# Patient Record
Sex: MALE | URBAN - METROPOLITAN AREA
[De-identification: names, ages, dates, MRNs, and addresses within clinical notes are randomized per-mention and may not be internally consistent; named-entity substitution may affect disease eponyms.]

---

## 2022-09-16 ENCOUNTER — OFFICE VISIT (OUTPATIENT)
Dept: URBAN - METROPOLITAN AREA CLINIC 50 | Facility: CLINIC | Age: 87
End: 2022-09-16
Payer: MEDICARE

## 2022-09-16 DIAGNOSIS — H35.341 MACULAR CYST, HOLE, OR PSEUDOHOLE, RIGHT EYE: ICD-10-CM

## 2022-09-16 DIAGNOSIS — H35.3131 NONEXUDATIVE MACULAR DEGENERATION, EARLY DRY STAGE, BILATERAL: ICD-10-CM

## 2022-09-16 DIAGNOSIS — Z96.1 PRESENCE OF INTRAOCULAR LENS: ICD-10-CM

## 2022-09-16 DIAGNOSIS — H16.223 KERATOCONJUNCTIVITIS SICCA, BILATERAL: ICD-10-CM

## 2022-09-16 DIAGNOSIS — H35.372 PUCKERING OF MACULA, LEFT EYE: ICD-10-CM

## 2022-09-16 DIAGNOSIS — E11.9 DIABETES MELLITUS TYPE 2 WITHOUT MENTION OF COMPLICATION: Primary | ICD-10-CM

## 2022-09-16 PROCEDURE — 99214 OFFICE O/P EST MOD 30 MIN: CPT | Performed by: OPTOMETRIST

## 2022-09-16 PROCEDURE — 92134 CPTRZ OPH DX IMG PST SGM RTA: CPT | Performed by: OPTOMETRIST

## 2022-09-16 ASSESSMENT — KERATOMETRY
OS: 46.33
OD: 46.55

## 2022-09-16 ASSESSMENT — INTRAOCULAR PRESSURE
OD: 14
OS: 17

## 2022-09-16 NOTE — IMPRESSION/PLAN
Impression: Diabetes mellitus Type 2 without mention of complication: F60.4. Plan: Diabetes type II: No background retinopathy, no signs of neovascularization noted. Discussed ocular and systemic benefits of blood sugar control. Discussed risks of progression. Patient to call if signs are symptoms should arise.

## 2022-09-16 NOTE — IMPRESSION/PLAN
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 Plan: OD: Discussed diagnosis in detail with patient. No changes indicated with hole, therefore no need to see RCA. Will continue to observe condition and or symptoms.

## 2022-09-16 NOTE — IMPRESSION/PLAN
Impression: Keratoconjunctivitis sicca, bilateral: L76.206. Plan: Dry eyes account for the patient's complaints. There is no evidence of permanent changes to the cornea. Explained condition does not have a cure and will need artificial tears for maintenance, recommended to use 4-6 times a day.

## 2023-09-14 ENCOUNTER — OFFICE VISIT (OUTPATIENT)
Dept: URBAN - METROPOLITAN AREA CLINIC 50 | Facility: CLINIC | Age: 88
End: 2023-09-14
Payer: MEDICARE

## 2023-09-14 DIAGNOSIS — H35.341 MACULAR CYST, HOLE, OR PSEUDOHOLE, RIGHT EYE: ICD-10-CM

## 2023-09-14 DIAGNOSIS — H16.223 KERATOCONJUNCTIVITIS SICCA, BILATERAL: ICD-10-CM

## 2023-09-14 DIAGNOSIS — H35.3121 NONEXUDATIVE MACULAR DEGENERATION, EARLY DRY STAGE, LEFT EYE: ICD-10-CM

## 2023-09-14 DIAGNOSIS — E11.9 DIABETES MELLITUS TYPE 2 WITHOUT MENTION OF COMPLICATION: Primary | ICD-10-CM

## 2023-09-14 DIAGNOSIS — H52.4 PRESBYOPIA: ICD-10-CM

## 2023-09-14 DIAGNOSIS — H35.372 PUCKERING OF MACULA, LEFT EYE: ICD-10-CM

## 2023-09-14 DIAGNOSIS — Z96.1 PRESENCE OF INTRAOCULAR LENS: ICD-10-CM

## 2023-09-14 DIAGNOSIS — H35.3113 NONEXUDATIVE MACULAR DEGENERATION, ADVANCED ATROPHIC WITHOUT SUBFOVEAL INVOLVEMENT, RIGHT EYE: ICD-10-CM

## 2023-09-14 DIAGNOSIS — H16.143 PUNCTATE KERATITIS, BILATERAL: ICD-10-CM

## 2023-09-14 PROCEDURE — 92134 CPTRZ OPH DX IMG PST SGM RTA: CPT | Performed by: OPTOMETRIST

## 2023-09-14 PROCEDURE — 99214 OFFICE O/P EST MOD 30 MIN: CPT | Performed by: OPTOMETRIST

## 2023-09-14 ASSESSMENT — KERATOMETRY
OD: 46.65
OS: 46.24

## 2023-09-14 ASSESSMENT — INTRAOCULAR PRESSURE
OS: 16
OD: 15

## 2023-09-14 ASSESSMENT — VISUAL ACUITY
OS: 20/25
OD: 20/150